# Patient Record
(demographics unavailable — no encounter records)

---

## 2025-06-23 NOTE — HISTORY OF PRESENT ILLNESS
[FreeTextEntry1] : 71 y/o M with PMHx of DM, Hypogonadism, hypothyroidism, hemochromatosis and hepatocellular carcinoma presents for a 3 month follow up for hip replacement.  [de-identified] : 71 y/o M with PMHx of DM, Hypogonadism, hypothyroidism, hemochromatosis and hepatocellular carcinoma presents for a 3 month follow up for hip replacement. Pt states that he is complaint with his medications. Patient reports the surgery went well and no complications. He states that after 3 weeks he felt back to 100%. He denies any other abnormal problems.

## 2025-06-23 NOTE — ASSESSMENT
[FreeTextEntry1] : 71 y/o M with PMHx of DM, Hypogonadism, hypothyroidism, hemochromatosis and hepatocellular carcinoma presents for a 3 month follow up for hip replacement. Pt states that he is complaint with his medications. Patient reports the surgery went well and no complications. He states that after 3 weeks he felt back to 100%. He denies any other abnormal problems.

## 2025-06-23 NOTE — HEALTH RISK ASSESSMENT
[1 or 2 (0 pts)] : 1 or 2 (0 points) [Never (0 pts)] : Never (0 points) [No] : In the past 12 months have you used drugs other than those required for medical reasons? No [No falls in past year] : Patient reported no falls in the past year [0] : 2) Feeling down, depressed, or hopeless: Not at all (0) [PHQ-2 Negative - No further assessment needed] : PHQ-2 Negative - No further assessment needed [Patient/Caregiver not ready to engage] : , patient/caregiver not ready to engage [I will adhere to the patient's wishes.] : I will adhere to the patient's wishes. [Time Spent: ___ minutes] : Time Spent: [unfilled] minutes [Never] : Never [Audit-CScore] : 0 [de-identified] : average  [de-identified] : average  [facility] : 9 [[id number]] : 0 [AdvancecareDate] : 03/24

## 2025-06-23 NOTE — HEALTH RISK ASSESSMENT
[1 or 2 (0 pts)] : 1 or 2 (0 points) [Never (0 pts)] : Never (0 points) [No] : In the past 12 months have you used drugs other than those required for medical reasons? No [No falls in past year] : Patient reported no falls in the past year [0] : 2) Feeling down, depressed, or hopeless: Not at all (0) [PHQ-2 Negative - No further assessment needed] : PHQ-2 Negative - No further assessment needed [Patient/Caregiver not ready to engage] : , patient/caregiver not ready to engage [I will adhere to the patient's wishes.] : I will adhere to the patient's wishes. [Time Spent: ___ minutes] : Time Spent: [unfilled] minutes [Never] : Never [Audit-CScore] : 0 [de-identified] : average  [de-identified] : average  [facility]] : 9 [[id number]] : 0 [AdvancecareDate] : 03/24

## 2025-06-23 NOTE — HISTORY OF PRESENT ILLNESS
[FreeTextEntry1] : 69 y/o M with PMHx of DM, Hypogonadism, hypothyroidism, hemochromatosis and hepatocellular carcinoma presents for a 3 month follow up for hip replacement.  [de-identified] : 69 y/o M with PMHx of DM, Hypogonadism, hypothyroidism, hemochromatosis and hepatocellular carcinoma presents for a 3 month follow up for hip replacement. Pt states that he is complaint with his medications. Patient reports the surgery went well and no complications. He states that after 3 weeks he felt back to 100%. He denies any other abnormal problems.

## 2025-06-23 NOTE — PLAN
[FreeTextEntry1] : 71 y/o M with PMHx of DM, Hypogonadism, hypothyroidism, hemochromatosis and hepatocellular carcinoma presents for a 3 month follow up for hip replacement. Pt states that he is complaint with his medications. Patient reports the surgery went well and no complications. He states that after 3 weeks he felt back to 100%. He denies any other abnormal problems.  - Care plan reviewed  - Labs drawn  - Medications reviewed - RTC 3 months

## 2025-06-23 NOTE — PLAN
[FreeTextEntry1] : 69 y/o M with PMHx of DM, Hypogonadism, hypothyroidism, hemochromatosis and hepatocellular carcinoma presents for a 3 month follow up for hip replacement. Pt states that he is complaint with his medications. Patient reports the surgery went well and no complications. He states that after 3 weeks he felt back to 100%. He denies any other abnormal problems.  - Care plan reviewed  - Labs drawn  - Medications reviewed - RTC 3 months